# Patient Record
Sex: FEMALE | Race: BLACK OR AFRICAN AMERICAN | NOT HISPANIC OR LATINO | URBAN - METROPOLITAN AREA
[De-identification: names, ages, dates, MRNs, and addresses within clinical notes are randomized per-mention and may not be internally consistent; named-entity substitution may affect disease eponyms.]

---

## 2024-01-09 ENCOUNTER — OFFICE VISIT (OUTPATIENT)
Dept: URGENT CARE | Facility: CLINIC | Age: 24
End: 2024-01-09
Payer: COMMERCIAL

## 2024-01-09 VITALS
SYSTOLIC BLOOD PRESSURE: 117 MMHG | TEMPERATURE: 98.3 F | HEART RATE: 70 BPM | WEIGHT: 135 LBS | RESPIRATION RATE: 16 BRPM | OXYGEN SATURATION: 99 % | DIASTOLIC BLOOD PRESSURE: 88 MMHG

## 2024-01-09 DIAGNOSIS — R35.0 URINARY FREQUENCY: ICD-10-CM

## 2024-01-09 DIAGNOSIS — N39.0 ACUTE UTI: Primary | ICD-10-CM

## 2024-01-09 DIAGNOSIS — R30.0 DYSURIA: ICD-10-CM

## 2024-01-09 LAB
POC APPEARANCE, URINE: ABNORMAL
POC BILIRUBIN, URINE: NEGATIVE
POC BLOOD, URINE: NEGATIVE
POC COLOR, URINE: YELLOW
POC GLUCOSE, URINE: NEGATIVE MG/DL
POC KETONES, URINE: NEGATIVE MG/DL
POC LEUKOCYTES, URINE: ABNORMAL
POC NITRITE,URINE: NEGATIVE
POC PH, URINE: 7 PH
POC PROTEIN, URINE: NEGATIVE MG/DL
POC SPECIFIC GRAVITY, URINE: 1.02
POC UROBILINOGEN, URINE: 0.2 EU/DL

## 2024-01-09 PROCEDURE — 87086 URINE CULTURE/COLONY COUNT: CPT

## 2024-01-09 PROCEDURE — 1036F TOBACCO NON-USER: CPT | Performed by: FAMILY MEDICINE

## 2024-01-09 PROCEDURE — 87186 SC STD MICRODIL/AGAR DIL: CPT

## 2024-01-09 PROCEDURE — 81002 URINALYSIS NONAUTO W/O SCOPE: CPT | Performed by: FAMILY MEDICINE

## 2024-01-09 PROCEDURE — 99203 OFFICE O/P NEW LOW 30 MIN: CPT | Performed by: FAMILY MEDICINE

## 2024-01-09 RX ORDER — SULFAMETHOXAZOLE AND TRIMETHOPRIM 800; 160 MG/1; MG/1
1 TABLET ORAL 2 TIMES DAILY
Qty: 10 TABLET | Refills: 0 | Status: SHIPPED | OUTPATIENT
Start: 2024-01-09 | End: 2024-01-14

## 2024-01-09 RX ORDER — NORELGESTROMIN AND ETHINYL ESTRADIOL 150; 35 UG/D; UG/D
PATCH TRANSDERMAL
COMMUNITY

## 2024-01-09 ASSESSMENT — ENCOUNTER SYMPTOMS
BACK PAIN: 0
ABDOMINAL PAIN: 0
CONSTIPATION: 0
NAUSEA: 0
FEVER: 0
CHILLS: 0
HEMATURIA: 0
SINUS PRESSURE: 0
COUGH: 0
RHINORRHEA: 0
DIARRHEA: 0
WHEEZING: 0
SHORTNESS OF BREATH: 0
HEADACHES: 0
VOMITING: 0
SORE THROAT: 0
FREQUENCY: 1
DYSURIA: 1

## 2024-01-09 ASSESSMENT — PAIN SCALES - GENERAL: PAINLEVEL: 0-NO PAIN

## 2024-01-09 NOTE — PROGRESS NOTES
Subjective   Patient ID: Monisha Rios is a 23 y.o. female.     23-year-old   female presents with complaint of urinary tract infection.  Reports burning and frequency for the past 3 days.      History provided by:  Patient  Difficulty Urinating  Pain severity:  No pain  Onset quality:  Sudden  Duration:  3 days  Timing:  Constant  Progression:  Worsening  Chronicity:  New  Recent urinary tract infections: no    Associated symptoms: no abdominal pain, no fever, no nausea and no vomiting        The following portions of the chart were reviewed this encounter and updated as appropriate:  Tobacco  Allergies  Meds  Problems  Med Hx  Surg Hx  Fam Hx         Review of Systems   Constitutional:  Negative for chills and fever.   HENT:  Negative for congestion, ear pain, rhinorrhea, sinus pressure and sore throat.    Respiratory:  Negative for cough, shortness of breath and wheezing.    Gastrointestinal:  Negative for abdominal pain, constipation, diarrhea, nausea and vomiting.   Genitourinary:  Positive for dysuria and frequency. Negative for hematuria and urgency.   Musculoskeletal:  Negative for back pain.   Neurological:  Negative for headaches.     Objective   Physical Exam  Vital signs are reviewed. Alert and oriented x3 with normal mood and affect  Patient is well nourished, well-developed, alert and in no acute distress    External eyes, orbits, conjunctiva and eyelids are normal in appearance  Pupils are equal, round, reactive to light and accommodation, extraocular movements intact    External ears appear normal  External canals are normal in appearance  Right tympanic membrane is intact and pale gray in appearance  Left tympanic membrane is intact and pale gray in appearance  There is no middle ear effusion noted on the right  There is no middle ear effusion noted on the left  External appearance of the nose is normal  Nasal mucosa, septum, turbinates are  slightly swollen and moderately  reddened in appearance  There is  thin yellow nasal discharge in both nares    Oral mucosa is uniformly pink and moist  Palate is pink, symmetric and intact  Tongue is moist, mobile and midline   posterior pharynx mildlyerythematous with no concretions or exudates present  No cervical lymphadenopathy palpated    Heart has regular rate and rhythm. No murmurs, rubs or gallops are auscultated at this exam.    Respiratory rate rhythm and effort are normal. Breath sounds bilaterally are clear on auscultation without crackles, rhonchi, wheezes or friction rub.    Abdomen: Normal bowel sounds on auscultation. Soft, nontender without rebound or rigidity on palpation.  Denies CVA tenderness.  Complains of low  back pain at the level of the iliac crests.  Reports bladder cramping              Procedures    Assessment/Plan   Diagnoses and all orders for this visit:  Acute UTI  -     sulfamethoxazole-trimethoprim (Bactrim DS) 800-160 mg tablet; Take 1 tablet by mouth 2 times a day for 5 days.  Dysuria  -     POCT UA (nonautomated) manually resulted  -     Urine Culture  Urinary frequency

## 2024-01-09 NOTE — PATIENT INSTRUCTIONS
You have a urinary tract infection.  This is also sometimes called a bladder infection.  Please take Bactrim DS as prescribed until gone  Increase  your oral fluids for the next 7-10 days  May use ibuprofen 200mg , 2 tabs by mouth every 8 hours as needed for discomfort or fever.  May take Tylenol 325 mg, 2 tabs by mouth every 4-6 hours as needed for discomfort or fever  You had a urine dipstick done today. This test can give information about whether your symptoms are due to urinary tract infection. We also sent your urine for bacterial culture in the lab. This test can confirm that you have an infection and what the best antibiotic treatment would be.  We will notify you if a change in medicine is indicated based on the results of that test within 3-4 days of your visit.    If no improvement in 3-5 days follow-up with primary care provider  If  you have blood in the urine or on the dipstick test you must follow-up with your primary doctor in 2 weeks to assure that this has stopped.    If fever greater 102 degrees Fahrenheit, chills, nausea, vomiting, increased abdominal pain, increased pain with urinating, increased blood in urine or severe flank pain please go to emergency department for further evaluation  This note was generated by voice recognition software. Minor transcription/grammatical errors may be present. Please call for clarification.

## 2024-01-12 ENCOUNTER — TELEPHONE (OUTPATIENT)
Dept: URGENT CARE | Facility: CLINIC | Age: 24
End: 2024-01-12
Payer: COMMERCIAL

## 2024-01-12 DIAGNOSIS — N39.0 URINARY TRACT INFECTION WITHOUT HEMATURIA, SITE UNSPECIFIED: Primary | ICD-10-CM

## 2024-01-12 LAB — BACTERIA UR CULT: ABNORMAL

## 2024-01-12 RX ORDER — NITROFURANTOIN 25; 75 MG/1; MG/1
100 CAPSULE ORAL 2 TIMES DAILY
Qty: 14 CAPSULE | Refills: 0 | Status: SHIPPED | OUTPATIENT
Start: 2024-01-12 | End: 2024-01-19

## 2024-01-12 NOTE — TELEPHONE ENCOUNTER
Pt notified of urine cx results showing resistant pattern to bactrim given at time of visit. Rx for macrobid 100 mg bid x 1 week sent to pt pharmacy. Advised to stop bactrim. Pcp follow up if not improving or worsening this week.

## 2025-01-30 LAB
NON-UH HIE GENPROBE CHLAMYDIA: NEGATIVE
NON-UH HIE GENPROBE GC: NEGATIVE
NON-UH HIE GP TRICHOMONAS: NEGATIVE

## 2025-02-03 LAB — NON-UH HIE THINPREP TIS PAP: ABNORMAL

## 2025-02-06 LAB — NON-UH HIE GP HPV REFLEX: POSITIVE

## 2025-02-13 LAB
NON-UH HIE GENOTYPE 16: NEGATIVE
NON-UH HIE GENOTYPE 18/45: NEGATIVE